# Patient Record
Sex: FEMALE | Race: WHITE | NOT HISPANIC OR LATINO | URBAN - METROPOLITAN AREA
[De-identification: names, ages, dates, MRNs, and addresses within clinical notes are randomized per-mention and may not be internally consistent; named-entity substitution may affect disease eponyms.]

---

## 2018-07-14 ENCOUNTER — EMERGENCY (EMERGENCY)
Facility: HOSPITAL | Age: 73
LOS: 1 days | Discharge: HOME | End: 2018-07-14
Attending: EMERGENCY MEDICINE | Admitting: EMERGENCY MEDICINE
Payer: MEDICARE

## 2018-07-14 VITALS
RESPIRATION RATE: 17 BRPM | OXYGEN SATURATION: 98 % | TEMPERATURE: 98 F | WEIGHT: 119.93 LBS | SYSTOLIC BLOOD PRESSURE: 139 MMHG | HEART RATE: 55 BPM | DIASTOLIC BLOOD PRESSURE: 65 MMHG
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
## 2018-07-14 DIAGNOSIS — E78.00 PURE HYPERCHOLESTEROLEMIA, UNSPECIFIED: ICD-10-CM
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
## 2018-07-14 DIAGNOSIS — I10 ESSENTIAL (PRIMARY) HYPERTENSION: ICD-10-CM
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
## 2018-07-14 DIAGNOSIS — T22.111A BURN OF FIRST DEGREE OF RIGHT FOREARM, INITIAL ENCOUNTER: ICD-10-CM

## 2018-07-14 DIAGNOSIS — I25.10 ATHEROSCLEROTIC HEART DISEASE OF NATIVE CORONARY ARTERY WITHOUT ANGINA PECTORIS: ICD-10-CM

## 2018-07-14 DIAGNOSIS — Z79.2 LONG TERM (CURRENT) USE OF ANTIBIOTICS: ICD-10-CM

## 2018-07-14 DIAGNOSIS — Z79.899 OTHER LONG TERM (CURRENT) DRUG THERAPY: ICD-10-CM

## 2018-07-14 DIAGNOSIS — Z95.5 PRESENCE OF CORONARY ANGIOPLASTY IMPLANT AND GRAFT: Chronic | ICD-10-CM
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
## 2018-07-14 DIAGNOSIS — Z79.82 LONG TERM (CURRENT) USE OF ASPIRIN: ICD-10-CM

## 2018-07-14 PROCEDURE — 99282 EMERGENCY DEPT VISIT SF MDM: CPT | Mod: 25

## 2018-07-14 PROCEDURE — 16000 INITIAL TREATMENT OF BURN(S): CPT

## 2018-07-14 RX ORDER — BACITRACIN ZINC 500 UNIT/G
1 OINTMENT IN PACKET (EA) TOPICAL ONCE
Qty: 0 | Refills: 0 | Status: COMPLETED | OUTPATIENT
Start: 2018-07-14 | End: 2018-07-14

## 2018-07-14 RX ORDER — LIDOCAINE AND PRILOCAINE CREAM 25; 25 MG/G; MG/G
1 CREAM TOPICAL ONCE
Qty: 0 | Refills: 0 | Status: COMPLETED | OUTPATIENT
Start: 2018-07-14 | End: 2018-07-14

## 2018-07-14 RX ADMIN — LIDOCAINE AND PRILOCAINE CREAM 1 APPLICATION(S): 25; 25 CREAM TOPICAL at 17:43

## 2018-07-14 RX ADMIN — Medication 1 APPLICATION(S): at 17:43

## 2018-07-14 NOTE — ED PROVIDER NOTE - OBJECTIVE STATEMENT
72 F here with burn to R mid forearm - happened yesterday with hot tea kettle. Was applying topical abx at home. Hurt more today than yesterday. Came for eval as she though that she might need Silvadene.

## 2018-11-19 PROBLEM — E78.00 PURE HYPERCHOLESTEROLEMIA, UNSPECIFIED: Chronic | Status: ACTIVE | Noted: 2018-07-14

## 2018-11-19 PROBLEM — I25.10 ATHEROSCLEROTIC HEART DISEASE OF NATIVE CORONARY ARTERY WITHOUT ANGINA PECTORIS: Chronic | Status: ACTIVE | Noted: 2018-07-14

## 2018-11-19 PROBLEM — Z00.00 ENCOUNTER FOR PREVENTIVE HEALTH EXAMINATION: Status: ACTIVE | Noted: 2018-11-19

## 2018-12-03 ENCOUNTER — APPOINTMENT (OUTPATIENT)
Dept: PHYSICAL MEDICINE AND REHAB | Facility: CLINIC | Age: 73
End: 2018-12-03
Payer: MEDICARE

## 2018-12-03 VITALS
HEART RATE: 67 BPM | HEIGHT: 61 IN | RESPIRATION RATE: 16 BRPM | OXYGEN SATURATION: 95 % | BODY MASS INDEX: 22.66 KG/M2 | WEIGHT: 120 LBS

## 2018-12-03 DIAGNOSIS — Z82.61 FAMILY HISTORY OF ARTHRITIS: ICD-10-CM

## 2018-12-03 DIAGNOSIS — M16.12 UNILATERAL PRIMARY OSTEOARTHRITIS, LEFT HIP: ICD-10-CM

## 2018-12-03 DIAGNOSIS — Z86.79 PERSONAL HISTORY OF OTHER DISEASES OF THE CIRCULATORY SYSTEM: ICD-10-CM

## 2018-12-03 DIAGNOSIS — Z80.9 FAMILY HISTORY OF MALIGNANT NEOPLASM, UNSPECIFIED: ICD-10-CM

## 2018-12-03 DIAGNOSIS — M70.62 TROCHANTERIC BURSITIS, LEFT HIP: ICD-10-CM

## 2018-12-03 DIAGNOSIS — Z86.39 PERSONAL HISTORY OF OTHER ENDOCRINE, NUTRITIONAL AND METABOLIC DISEASE: ICD-10-CM

## 2018-12-03 DIAGNOSIS — M17.12 UNILATERAL PRIMARY OSTEOARTHRITIS, LEFT KNEE: ICD-10-CM

## 2018-12-03 PROCEDURE — 99204 OFFICE O/P NEW MOD 45 MIN: CPT

## 2018-12-03 RX ORDER — ASPIRIN ENTERIC COATED TABLETS 81 MG 81 MG/1
81 TABLET, DELAYED RELEASE ORAL
Refills: 0 | Status: ACTIVE | COMMUNITY

## 2018-12-03 RX ORDER — ZOLPIDEM TARTRATE 5 MG/1
5 TABLET ORAL
Refills: 0 | Status: ACTIVE | COMMUNITY

## 2018-12-03 RX ORDER — CITALOPRAM HYDROBROMIDE 20 MG/1
20 TABLET, FILM COATED ORAL
Refills: 0 | Status: ACTIVE | COMMUNITY

## 2018-12-03 RX ORDER — PROPRANOLOL HYDROCHLORIDE 20 MG/1
20 TABLET ORAL
Refills: 0 | Status: ACTIVE | COMMUNITY

## 2018-12-03 RX ORDER — UBIQUINOL 100 MG
CAPSULE ORAL
Refills: 0 | Status: ACTIVE | COMMUNITY

## 2018-12-03 RX ORDER — MELOXICAM 7.5 MG/1
7.5 TABLET ORAL TWICE DAILY
Qty: 60 | Refills: 1 | Status: ACTIVE | COMMUNITY
Start: 2018-12-03 | End: 1900-01-01

## 2018-12-03 RX ORDER — IBUPROFEN 200 MG
600 CAPSULE ORAL
Refills: 0 | Status: ACTIVE | COMMUNITY

## 2018-12-03 RX ORDER — ROSUVASTATIN CALCIUM 10 MG/1
10 TABLET, FILM COATED ORAL
Refills: 0 | Status: ACTIVE | COMMUNITY

## 2018-12-03 RX ORDER — BUPROPION HYDROCHLORIDE 150 MG/1
150 TABLET, FILM COATED, EXTENDED RELEASE ORAL
Refills: 0 | Status: ACTIVE | COMMUNITY

## 2018-12-04 PROBLEM — M70.62 GREATER TROCHANTERIC BURSITIS, LEFT: Status: ACTIVE | Noted: 2018-12-04

## 2022-06-09 ENCOUNTER — EMERGENCY (EMERGENCY)
Facility: HOSPITAL | Age: 77
LOS: 1 days | Discharge: HOME | End: 2022-06-09
Admitting: EMERGENCY MEDICINE
Payer: MEDICARE

## 2022-06-09 VITALS
OXYGEN SATURATION: 96 % | TEMPERATURE: 98 F | RESPIRATION RATE: 18 BRPM | SYSTOLIC BLOOD PRESSURE: 161 MMHG | HEART RATE: 55 BPM | HEIGHT: 61 IN | WEIGHT: 115.08 LBS | DIASTOLIC BLOOD PRESSURE: 65 MMHG

## 2022-06-09 DIAGNOSIS — Z95.5 PRESENCE OF CORONARY ANGIOPLASTY IMPLANT AND GRAFT: Chronic | ICD-10-CM

## 2022-06-09 PROCEDURE — 99283 EMERGENCY DEPT VISIT LOW MDM: CPT

## 2022-06-09 RX ORDER — VALACYCLOVIR 500 MG/1
1 TABLET, FILM COATED ORAL
Qty: 21 | Refills: 0
Start: 2022-06-09 | End: 2022-06-15

## 2022-06-09 RX ORDER — VALACYCLOVIR 500 MG/1
1000 TABLET, FILM COATED ORAL ONCE
Refills: 0 | Status: COMPLETED | OUTPATIENT
Start: 2022-06-09 | End: 2022-06-09

## 2022-06-09 RX ADMIN — VALACYCLOVIR 1000 MILLIGRAM(S): 500 TABLET, FILM COATED ORAL at 12:58

## 2022-06-09 NOTE — ED PROVIDER NOTE - CLINICAL SUMMARY MEDICAL DECISION MAKING FREE TEXT BOX
pt presents with shingles like rash to left lower back x 2 days. not vaccinated for shingles. will give valtrex. advised f/u with pmd. pt verbalizes understanding.

## 2022-06-09 NOTE — ED PROVIDER NOTE - NSFOLLOWUPINSTRUCTIONS_ED_ALL_ED_FT
TAKE VALTREX THREE TIMES DAILY FOR 7 DAYS AS PRESCRIBED.     TAKE 650MG TYLENOL EVERY 6 HOURS AS NEEDED FOR PAIN.     TAKE IBUPROFEN 600MG EVERY 6 HOURS WITH FOOD AS NEEDED FOR PAIN.       Shingles    A rash on the skin.   Shingles is an infection. It gives you a painful skin rash and blisters that have fluid in them. Shingles is caused by the same germ (virus) that causes chickenpox.    Shingles only happens in people who:  •Have had chickenpox.      •Have been given a shot (vaccine) to protect against chickenpox. Shingles is rare in this group.        What are the causes?    This condition is caused by varicella-zoster virus. This is the same germ that causes chickenpox. After a person is exposed to the germ, the germ stays in the body but is not active (dormant).    Shingles develops if the germ becomes active again (is reactivated). This can happen many years after the first exposure to the germ. It is not known what causes this germ to become active again.      What increases the risk?    People who have had chickenpox or received the chickenpox shot are at risk for shingles. This infection is more common in people who:  •Are older than 60 years of age.    •Have a weakened disease-fighting system (immune system), such as people with:  •HIV (human immunodeficiency virus).      •AIDS (acquired immunodeficiency syndrome).      •Cancer.        •Are taking medicines that weaken the immune system, such as organ transplant medicines.      •Have a lot of stress.        What are the signs or symptoms?    The first symptoms of shingles may be itching, tingling, or pain in an area on your skin.    A rash will show on your skin a few days or weeks later. This is what usually happens:  •The rash is likely to be on one side of your body.       •The rash usually has a shape like a belt or a band. Over time, the rash turns into fluid-filled blisters.      •The blisters will break open and change into scabs.      •The scabs usually dry up in about 2–3 weeks.      You may also have:  •A fever.      •Chills.      •A headache.      •A feeling like you may vomit (nausea).        How is this treated?    The rash may last for several weeks. There is not a specific cure for this condition.    Your doctor may prescribe medicines. Medicines may:  •Help with pain.      •Help you get better sooner.      •Help to prevent long-term problems.      •Help with itching (antihistamines).      If the area involved is on your face, you may need to see a specialist. This may be an eye doctor or an ear, nose, and throat (ENT) doctor.      Follow these instructions at home:    Medicines     •Take over-the-counter and prescription medicines only as told by your doctor.      •Put on an anti-itch cream or numbing cream where you have a rash, blisters, or scabs. Do this as told by your doctor.        Helping with itching and discomfort   A bathtub filled with water.    •Put cold, wet cloths (cold compresses) on the area of the rash or blisters as told by your doctor.      •Cool baths can help you feel better. Try adding baking soda or dry oatmeal to the water to lessen itching. Do not bathe in hot water.      •Use calamine lotion as told by your doctor.      Blister and rash care     •Keep your rash covered with a loose bandage (dressing).       •Wear loose clothing that does not rub on your rash.      •Wash your hands with soap and water for at least 20 seconds before and after you change your bandage. If you cannot use soap and water, use hand .      •Change your bandage as told by your doctor.      •Keep your rash and blisters clean. To do this, wash the area with mild soap and cool water as told by your doctor.    •Check your rash every day for signs of infection. Check for:  •More redness, swelling, or pain.      •Fluid or blood.      •Warmth.      •Pus or a bad smell.      • Do not scratch your rash. Do not pick at your blisters. To help you to not scratch:  •Keep your fingernails clean and cut short.      •Wear gloves or mittens when you sleep, if scratching is a problem.        General instructions     •Rest as told by your doctor.      •Wash your hands often with soap and water for at least 20 seconds. If you cannot use soap and water, use hand . Doing this lowers your chance of getting a skin infection.    •Your infection can cause chickenpox in people who have never had chickenpox or never got a chickenpox vaccine shot. If you have blisters that did not change into scabs yet, try not to touch other people or be around other people, especially:  •Babies.      •Pregnant women.      •Children who have areas of red, itchy, or rough skin (eczema).      •Older people who have organ transplants.      •People who have a long-term (chronic) illness, like cancer or AIDS.        •Keep all follow-up visits.        How is this prevented?    A vaccine shot is the best way to prevent shingles and protect against shingles problems.    If you have not had a vaccine shot, talk with your doctor about getting it.      Where to find more information    •Centers for Disease Control and Prevention: www.cdc.gov        Contact a doctor if:    •Your pain does not get better with medicine.      •Your pain does not get better after the rash heals.    •You have any of these signs of infection around the rash:  •More redness, swelling, or pain.      •Fluid or blood.      •Warmth.      •Pus or a bad smell.        •You have a fever.        Get help right away if:    •The rash is on your face or nose.      •You have pain in your face or pain by your eye.      •You lose feeling on one side of your face.      •You have trouble seeing.      •You have ear pain, or you have ringing in your ear.      •You have a loss of taste.      •Your condition gets worse.        Summary    •Shingles gives you a painful skin rash and blisters that have fluid in them.      •Shingles is caused by the same germ (virus) that causes chickenpox.      •Keep your rash covered with a loose bandage. Wear loose clothing that does not rub on your rash.      •If you have blisters that did not change into scabs yet, try not to touch other people or be around people.      This information is not intended to replace advice given to you by your health care provider. Make sure you discuss any questions you have with your health care provider.

## 2022-06-09 NOTE — ED ADULT NURSE NOTE - OBJECTIVE STATEMENT
Patient presents with left lower back rash x 2 days, c/o pain and itchiness. Denies fever, chills, nausea, vomiting.

## 2022-06-09 NOTE — ED PROVIDER NOTE - OBJECTIVE STATEMENT
75yo F with h/o hyperthyroid presents with c/o painful rash to left back x 2 days. denies any prior similar episodes but notes she is not vaccinated against shingles. denies fever, chills, numbness, weakness, incontinence, difficulty walking, hematuria, dysuria, any other concerns.

## 2022-06-09 NOTE — ED PROVIDER NOTE - NSICDXPASTMEDICALHX_GEN_ALL_CORE_FT
PAST MEDICAL HISTORY:  CAD (coronary atherosclerotic disease)     H/O hyperthyroidism     HTN (hypertension)     Hypercholesterolemia

## 2022-06-09 NOTE — ED PROVIDER NOTE - MUSCULOSKELETAL, MLM
Spine appears normal, no midline vertebral tenderness or stepoff deformity.  range of motion is not limited, no muscle or joint tenderness

## 2022-06-09 NOTE — ED PROVIDER NOTE - SKIN, MLM
Skin normal color for race, warm, dry and intact. + erythematous tender vesicular rash to left lower back along T9 distribution, 4 patches of lesions, no visible drainage, no crusting or pus.

## 2022-06-09 NOTE — ED PROVIDER NOTE - PATIENT PORTAL LINK FT
You can access the FollowMyHealth Patient Portal offered by [facility] by registering at the following website: [email or web address]. By joining [employer]’s FollowMyHealth portal, you will also be able to view your health information using other applications (apps) compatible with our system.

## 2022-06-10 DIAGNOSIS — R21 RASH AND OTHER NONSPECIFIC SKIN ERUPTION: ICD-10-CM

## 2022-06-10 DIAGNOSIS — E03.9 HYPOTHYROIDISM, UNSPECIFIED: ICD-10-CM

## 2022-06-10 DIAGNOSIS — E78.00 PURE HYPERCHOLESTEROLEMIA, UNSPECIFIED: ICD-10-CM

## 2022-06-10 DIAGNOSIS — B02.9 ZOSTER WITHOUT COMPLICATIONS: ICD-10-CM

## 2022-06-10 DIAGNOSIS — I10 ESSENTIAL (PRIMARY) HYPERTENSION: ICD-10-CM

## 2022-06-10 DIAGNOSIS — I25.10 ATHEROSCLEROTIC HEART DISEASE OF NATIVE CORONARY ARTERY WITHOUT ANGINA PECTORIS: ICD-10-CM

## 2024-08-02 NOTE — ED ADULT TRIAGE NOTE - WEIGHT IN KG
From: Vicki Hutchison  To: Mehrdad Cummings DO  Sent: 8/2/2024 12:33 PM CDT  Subject: cyst on spine .. concerned     I am concerned about this cyst as my symptoms have been different the last few days jolts of pain and worse pain, and today i had worse irinary incontinence and looser bowels as well as yesterday had jolts of pain down my legs and a kind of kick spasm in one.. Also having tingling down legs today.   The nurse for mariola apodaca is recommending the ER.. not sure if that is needed.   52.2